# Patient Record
Sex: MALE | Race: BLACK OR AFRICAN AMERICAN
[De-identification: names, ages, dates, MRNs, and addresses within clinical notes are randomized per-mention and may not be internally consistent; named-entity substitution may affect disease eponyms.]

---

## 2018-04-18 ENCOUNTER — HOSPITAL ENCOUNTER (INPATIENT)
Dept: HOSPITAL 88 - ER | Age: 38
LOS: 3 days | Discharge: HOME | DRG: 419 | End: 2018-04-21
Attending: INTERNAL MEDICINE | Admitting: INTERNAL MEDICINE
Payer: COMMERCIAL

## 2018-04-18 VITALS — HEIGHT: 69 IN | BODY MASS INDEX: 46.65 KG/M2 | WEIGHT: 315 LBS

## 2018-04-18 DIAGNOSIS — E86.0: ICD-10-CM

## 2018-04-18 DIAGNOSIS — K80.12: Primary | ICD-10-CM

## 2018-04-18 DIAGNOSIS — E11.9: ICD-10-CM

## 2018-04-18 DIAGNOSIS — I10: ICD-10-CM

## 2018-04-18 LAB
ALBUMIN SERPL-MCNC: 3.6 G/DL (ref 3.5–5)
ALBUMIN/GLOB SERPL: 0.9 {RATIO} (ref 0.8–2)
ALP SERPL-CCNC: 69 IU/L (ref 40–150)
ALT SERPL-CCNC: 38 IU/L (ref 0–55)
AMYLASE SERPL-CCNC: 52 U/L (ref 25–125)
ANION GAP SERPL CALC-SCNC: 10.7 MMOL/L (ref 8–16)
BASOPHILS # BLD AUTO: 0.1 10*3/UL (ref 0–0.1)
BASOPHILS NFR BLD AUTO: 0.7 % (ref 0–1)
BUN SERPL-MCNC: 14 MG/DL (ref 7–26)
BUN/CREAT SERPL: 11 (ref 6–25)
CALCIUM SERPL-MCNC: 9.1 MG/DL (ref 8.4–10.2)
CHLORIDE SERPL-SCNC: 106 MMOL/L (ref 98–107)
CK SERPL-CCNC: 1039 IU/L (ref 30–200)
CO2 SERPL-SCNC: 26 MMOL/L (ref 22–29)
DEPRECATED NEUTROPHILS # BLD AUTO: 4 10*3/UL (ref 2.1–6.9)
EGFRCR SERPLBLD CKD-EPI 2021: > 60 ML/MIN (ref 60–?)
EOSINOPHIL # BLD AUTO: 0.1 10*3/UL (ref 0–0.4)
EOSINOPHIL NFR BLD AUTO: 0.9 % (ref 0–6)
ERYTHROCYTE [DISTWIDTH] IN CORD BLOOD: 14.2 % (ref 11.7–14.4)
GLOBULIN PLAS-MCNC: 3.8 G/DL (ref 2.3–3.5)
GLUCOSE SERPLBLD-MCNC: 94 MG/DL (ref 74–118)
HCT VFR BLD AUTO: 40.8 % (ref 38.2–49.6)
HGB BLD-MCNC: 13.5 G/DL (ref 14–18)
LIPASE SERPL-CCNC: 51 U/L (ref 8–78)
LYMPHOCYTES # BLD: 3.8 10*3/UL (ref 1–3.2)
LYMPHOCYTES NFR BLD AUTO: 43.8 % (ref 18–39.1)
MCH RBC QN AUTO: 29 PG (ref 28–32)
MCHC RBC AUTO-ENTMCNC: 33.1 G/DL (ref 31–35)
MCV RBC AUTO: 87.6 FL (ref 81–99)
MONOCYTES # BLD AUTO: 0.7 10*3/UL (ref 0.2–0.8)
MONOCYTES NFR BLD AUTO: 8 % (ref 4.4–11.3)
NEUTS SEG NFR BLD AUTO: 46.4 % (ref 38.7–80)
PLATELET # BLD AUTO: 352 X10E3/UL (ref 140–360)
POTASSIUM SERPL-SCNC: 3.7 MMOL/L (ref 3.5–5.1)
RBC # BLD AUTO: 4.66 X10E6/UL (ref 4.3–5.7)
SODIUM SERPL-SCNC: 139 MMOL/L (ref 136–145)

## 2018-04-18 PROCEDURE — 36415 COLL VENOUS BLD VENIPUNCTURE: CPT

## 2018-04-18 PROCEDURE — 88304 TISSUE EXAM BY PATHOLOGIST: CPT

## 2018-04-18 PROCEDURE — 82553 CREATINE MB FRACTION: CPT

## 2018-04-18 PROCEDURE — 80053 COMPREHEN METABOLIC PANEL: CPT

## 2018-04-18 PROCEDURE — 83690 ASSAY OF LIPASE: CPT

## 2018-04-18 PROCEDURE — 81001 URINALYSIS AUTO W/SCOPE: CPT

## 2018-04-18 PROCEDURE — 82150 ASSAY OF AMYLASE: CPT

## 2018-04-18 PROCEDURE — 93005 ELECTROCARDIOGRAM TRACING: CPT

## 2018-04-18 PROCEDURE — 76705 ECHO EXAM OF ABDOMEN: CPT

## 2018-04-18 PROCEDURE — 85025 COMPLETE CBC W/AUTO DIFF WBC: CPT

## 2018-04-18 PROCEDURE — 85379 FIBRIN DEGRADATION QUANT: CPT

## 2018-04-18 PROCEDURE — 71260 CT THORAX DX C+: CPT

## 2018-04-18 PROCEDURE — 84484 ASSAY OF TROPONIN QUANT: CPT

## 2018-04-18 PROCEDURE — 82550 ASSAY OF CK (CPK): CPT

## 2018-04-18 PROCEDURE — 71045 X-RAY EXAM CHEST 1 VIEW: CPT

## 2018-04-18 PROCEDURE — 82948 REAGENT STRIP/BLOOD GLUCOSE: CPT

## 2018-04-18 PROCEDURE — 99284 EMERGENCY DEPT VISIT MOD MDM: CPT

## 2018-04-19 VITALS — SYSTOLIC BLOOD PRESSURE: 125 MMHG | DIASTOLIC BLOOD PRESSURE: 58 MMHG

## 2018-04-19 VITALS — DIASTOLIC BLOOD PRESSURE: 57 MMHG | SYSTOLIC BLOOD PRESSURE: 123 MMHG

## 2018-04-19 VITALS — SYSTOLIC BLOOD PRESSURE: 97 MMHG | DIASTOLIC BLOOD PRESSURE: 55 MMHG

## 2018-04-19 VITALS — SYSTOLIC BLOOD PRESSURE: 121 MMHG | DIASTOLIC BLOOD PRESSURE: 74 MMHG

## 2018-04-19 VITALS — SYSTOLIC BLOOD PRESSURE: 94 MMHG | DIASTOLIC BLOOD PRESSURE: 54 MMHG

## 2018-04-19 LAB
BACTERIA URNS QL MICRO: (no result) /HPF
BILIRUB UR QL: NEGATIVE
CK MB SERPL-MCNC: 1.6 NG/ML (ref 0–5)
CLARITY UR: CLEAR
COLOR UR: YELLOW
DEPRECATED RBC URNS MANUAL-ACNC: (no result) /HPF (ref 0–5)
EPI CELLS URNS QL MICRO: (no result) /LPF
KETONES UR QL STRIP.AUTO: NEGATIVE
LEUKOCYTE ESTERASE UR QL STRIP.AUTO: NEGATIVE
NITRITE UR QL STRIP.AUTO: NEGATIVE
PROT UR QL STRIP.AUTO: NEGATIVE
SP GR UR STRIP: 1.02 (ref 1.01–1.02)
UROBILINOGEN UR STRIP-MCNC: 0.2 MG/DL (ref 0.2–1)
WBC #/AREA URNS HPF: (no result) /HPF (ref 0–5)

## 2018-04-19 RX ADMIN — SODIUM CHLORIDE SCH MLS/HR: 9 INJECTION, SOLUTION INTRAVENOUS at 02:03

## 2018-04-19 RX ADMIN — CEFOXITIN SODIUM SCH MLS/HR: 1 INJECTION, SOLUTION INTRAVENOUS at 02:03

## 2018-04-19 RX ADMIN — INSULIN HUMAN SCH UNIT: 100 INJECTION, SOLUTION PARENTERAL at 20:42

## 2018-04-19 RX ADMIN — METRONIDAZOLE SCH MLS/HR: 500 INJECTION, SOLUTION INTRAVENOUS at 02:40

## 2018-04-19 RX ADMIN — METRONIDAZOLE SCH MLS/HR: 500 INJECTION, SOLUTION INTRAVENOUS at 17:51

## 2018-04-19 RX ADMIN — INSULIN HUMAN SCH UNIT: 100 INJECTION, SOLUTION PARENTERAL at 11:30

## 2018-04-19 RX ADMIN — INSULIN HUMAN SCH UNIT: 100 INJECTION, SOLUTION PARENTERAL at 07:30

## 2018-04-19 RX ADMIN — METRONIDAZOLE SCH MLS/HR: 500 INJECTION, SOLUTION INTRAVENOUS at 12:35

## 2018-04-19 RX ADMIN — CEFOXITIN SCH GM: 1 INJECTION, POWDER, FOR SOLUTION INTRAVENOUS at 20:47

## 2018-04-19 RX ADMIN — INSULIN HUMAN SCH UNIT: 100 INJECTION, SOLUTION PARENTERAL at 16:26

## 2018-04-19 RX ADMIN — SODIUM CHLORIDE SCH MLS/HR: 9 INJECTION, SOLUTION INTRAVENOUS at 14:25

## 2018-04-19 RX ADMIN — CEFOXITIN SODIUM SCH MLS/HR: 1 INJECTION, SOLUTION INTRAVENOUS at 05:34

## 2018-04-19 RX ADMIN — CEFOXITIN SCH GM: 1 INJECTION, POWDER, FOR SOLUTION INTRAVENOUS at 14:25

## 2018-04-19 RX ADMIN — SODIUM CHLORIDE SCH MLS/HR: 9 INJECTION, SOLUTION INTRAVENOUS at 20:47

## 2018-04-19 RX ADMIN — METRONIDAZOLE SCH MLS/HR: 500 INJECTION, SOLUTION INTRAVENOUS at 06:35

## 2018-04-19 RX ADMIN — HYDROMORPHONE HYDROCHLORIDE PRN MG: 1 INJECTION, SOLUTION INTRAMUSCULAR; INTRAVENOUS; SUBCUTANEOUS at 04:52

## 2018-04-19 NOTE — DIAGNOSTIC IMAGING REPORT
EXAM: CT CHEST W

DATE: 4/18/2018 11:51 PM  

INDICATION:  Right-sided chest pain

COMPARISON:  None

TECHNIQUE: Multidetector CT scanning of the chest was performed.  Coronal and

sagittal multiplanar reformations were obtained.    

IV Contrast: 100 ml Isovue 370/300



FINDINGS:

Evaluation degraded by suboptimal contrast bolus and noise related to body

habitus. This precludes optimal assessment for detection of pulmonary embolism

particularly at the segmental/subsegmental level. However no acute central

pulmonary embolism is seen to the lobar level. Main pulmonary artery is normal

in size.

LUNGS AND PLEURA: No consolidations or edema.  No effusions or pneumothorax.



HEART, MEDIASTINUM, VESSELS: Heart size is upper limits of normal.   No

pericardial effusion.  No adenopathy.  There is a 2.1 x 2.8 cm left lower

parathoracic cystic lesion (HU 18).



UPPER ABDOMEN: Unremarkable.



MUSCULOSKELETAL: No acute findings.



IMPRESSION:

1. Degraded by noise and suboptimal contrast bolus. No evidence of acute

central pulmonary embolism.

2. 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

cyst. Consider follow-up CT or MRI to document stability.



Signed by: Dr Catherine Graves MD on 4/19/2018 12:49 AM

## 2018-04-19 NOTE — HISTORY AND PHYSICAL
CHIEF COMPLAINT:  Abdominal pain.



HISTORY:  Patient is a 37-year-old male with abdominal pain going on for a 

few days.  The patient came in with severe pain of 8/10.  Patient has 

multiple workups done.  His imaging test gallbladder ultrasound showing 

that he has gallstone at the gallbladder neck with gallbladder wall 

thickening, negative for pericholecystic fluid, however.  The patient does 

have hepatic steatosis.  On evaluation, the patient does have pain.  Has CT 

of the chest also done as well, there was no pulmonary embolism.  The 

patient is otherwise stable at this time.  Laboratory showing that his 

white cell count was 8.5.  Urinalysis unremarkable.  Patient does have some 

persistent pain.



PAST MEDICAL HISTORY:  Borderline diabetes, hypertension.



PAST SURGICAL HISTORY:  Noncontributory except for knee surgery.



SOCIAL HISTORY:  Patient does not smoke or use alcohol.  No recreational 

drug use.



ALLERGIES:  NO KNOWN ALLERGIES.



HOME MEDICATIONS:  Dyazide.



REVIEW OF SYSTEMS:  As mentioned.



PHYSICAL EXAMINATION:

VITAL SIGNS:  Temperature is 98, blood pressure 94/54, pulse rate is 48, 

respiration 18.

GENERAL:  The patient is not in acute distress.  He is awake.

HEENT:  Normocephalic, atraumatic.  Sclerae anicteric.

NECK:  Supple grossly.

PULMONARY:  Diminished breath sounds bilaterally without any wheezing or 

rales.

CARDIOVASCULAR:  S1, S2.  Regular rate and rhythm.

ABDOMEN:  Soft.  Right upper quadrant tenderness.  No rebound or guarding.

EXTREMITIES:  No gross cyanosis or edema.

NEUROLOGIC:  There is no gross focal deficit.



LABORATORY:  Sodium is 139, potassium 3.7, chloride 106, bicarb 25, BUN 14, 

creatinine 1.2, glucose 94.  WBC is 8.6, hemoglobin 13.5, hematocrit 41, 

platelets is 352,000.



IMPRESSION:

1. Acute cholecystitis associated with gallstone.

2. Nausea and vomiting.

3. Abdominal pain.

4. Low blood pressure secondary to dehydration.



PLAN:  IV fluids gently.  Hold off on blood pressure medication.  

Antibiotics.  Consultation with Dr. Micah Hung.  Will monitor patient 

closely.







DD:  04/19/2018 09:29

DT:  04/19/2018 10:01

Job#:  [id number]

## 2018-04-19 NOTE — DIAGNOSTIC IMAGING REPORT
CHEST SINGLE (PORTABLE), 4/18/2018 10:55 PM



Technique: CHEST SINGLE (PORTABLE)

Comparison: None

Clinical history: Chest pain



Findings:

Unremarkable appearance of the heart, mediastinum, lungs and pleural spaces.



Impression:

1. Lines/Tubes: None

2. No acute abnormality.



Signed by: Dr Catherine Graves MD on 4/19/2018 12:31 AM

## 2018-04-19 NOTE — CONSULTATION
DATE OF CONSULTATION:  April 19, 2018 



Patient is a 37-year-old male who came to the hospital with complaints of 

epigastric abdominal pain.  Says the pain started yesterday about an hour 

after eating.  He has not had similar pains in the past.  He says he feels 

better now.  Ultrasound of the gallbladder was done, which revealed 

gallstones with a stone impacted in the neck of the gallbladder.  There are 

no symptoms of jaundice.



PAST MEDICAL HISTORY:  Otherwise unremarkable.  He denies chronic medical 

problems.  He has had previous knee surgery, tonsillectomy.  Significant 

for hypertension, diabetes, previous diverticulitis.  



MEDICATIONS:  Listed in the chart.  



ALLERGIES:  HE HAS NO KNOWN ALLERGIES.  



FAMILY HISTORY:  Noncontributory.



SOCIAL HISTORY:  Patient does not smoke cigarettes or drink alcohol.



REVIEW OF SYSTEMS:  As stated above.  He has had no fever and no weight 

loss.   

 

PHYSICAL EXAMINATION

GENERAL:  The patient is awake, alert and in no distress.  

VITALS:  Normal. 

HEENT:  Unremarkable.  Sclerae are nonicteric. 

NECK:  Supple with no masses.  

LUNGS:  Equal breath sounds are clear bilaterally.  

CARDIAC:  Regular rate and rhythm. 



ABDOMEN:  Soft.  There slight epigastric tenderness.  There is no mass.  

There is no organomegaly. 

EXTREMITIES:  Have no edema. 

NEUROLOGIC:  Grossly intact. 



LAB TESTS:  The white blood cell count is normal.  Hemoglobin and 

hematocrit are normal.  Chemistries reveal elevated creatinine kinase of 

1000.  Liver function tests are normal.  Amylase and lipase are normal.



ASSESSMENT:  A 37-year-old male with symptoms suggestive of acute 

cholecystitis, but now he seems somewhat better.  He is to be evaluated by 

medicine, and would likely benefit from cholecystectomy.  Tentatively, plan 

to schedule the surgery for tomorrow.  Procedure was explained to the 

patient, including risks, benefits and alternatives.  He understands the 

procedures, and had the opportunity to ask questions.  He is aware of the 

possible need for open surgery.  



Thank you for asking me to see Mr. Maria.  



 





DD:  04/19/2018 06:30

DT:  04/19/2018 06:51

Job#:  [id number]

## 2018-04-19 NOTE — DIAGNOSTIC IMAGING REPORT
EXAM: US GALLBLADDER

DATE: 4/18/2018 12:00 AM  

INDICATION:      , Right upper quadrant pain after eating

COMPARISON: None 

TECHNIQUE: Transverse and longitudinal gray scale and color doppler sonographic

images of the upper abdomen were obtained. 



FINDINGS:    

Examination is degraded by large body habitus and overlying bowel gas.

LIVER 

Poorly visualized

14.9 cm in the right midclavicular line.

Increased echogenicity, normal contour, no masses.



GALLBLADDER

Cholelithiasis with gallstone lodged at the gallbladder neck. Associated

gallbladder wall thickening (4 to 5 mm).

No pericholecystic fluid.

Negative sonographic Moreira's sign.



BILE DUCTS

No intra nor extra-hepatic biliary dilation.

Common bile duct measures     cm



PANCREAS: Visualized portions are normal.



RIGHT KIDNEY: 11.7 cm

Echogenicity: Normal

Collecting System:  No hydronephrosis

Stones:  None

Cyst/Mass: None



VESSELS:

Aorta: Not well-visualized

Inferior Vena Cava: Not well visualized

Main Portal Vein: 0.8 cm, hepatopetal flow.



FREE FLUID: None



IMPRESSION:



1. Gallstone at the gallbladder neck with gallbladder wall thickening but

negative for pericholecystic fluid or Moreira's sign. Findings could reflect

early acute cholecystitis in the proper clinical context. Consider HIDA for

further evaluation.



2. Hepatic steatosis.



Signed by: Dr Catherine Graves MD on 4/19/2018 12:36 AM

## 2018-04-19 NOTE — XMS REPORT
Patient Summary Document

 Created on: 2018



GLEN RODRIGUEZ

External Reference #: [id number]

: 1980

Sex: Male



Demographics







 Address  [address] [place] [address]

 

 Home Phone  [phone]

 

 Preferred Language  Unknown

 

 Marital Status  Unknown

 

 [employer] Affiliation  Unknown

 

 Race  Unknown

 

 Additional Race(s)  

 

 Ethnic Group  Unknown





Author







 Author  [facility]

 

 Address  Unknown

 

 Phone  Unavailable







Care Team Providers







 Care Team Member Name  Role  Phone

 

 ALEX HUERTA  Unavailable  Unavailable







Problems

This patient has no known problems.



Allergies, Adverse Reactions, Alerts

This patient has no known allergies or adverse reactions.



Medications

This patient has no known medications.



Results







 Test Description  Test Time  Test Comments  Text Results  Atomic Results  
Result Comments









 CT CHEST W            [place] [address]      Patient Name: GLEN RODRIGUEZ
   MR #: [id number]    : 1980 Age/Sex: 37/M  Acct #: [id number] Req #
: [id number]  Adm Physician:     Ordered by: ALEX HUERTA MD  Report #
: [id number]   Location: ER  Room/Bed:     ______________________________________
_____________________________________________________________    Procedure: 0418
-0029 CT/CT CHEST W  Exam Date: 18                            Exam Time: 
       REPORT STATUS: Signed    EXAM: CT CHEST W   DATE: 2018 11:51 PM
     INDICATION:  Right-sided chest pain   COMPARISON:  None   TECHNIQUE: 
Multidetector CT scanning of the chest was performed.  Coronal and   sagittal 
multiplanar reformations were obtained.       IV Contrast: 100 ml Isovue 370/
300      FINDINGS:   Evaluation degraded by suboptimal contrast bolus and noise 
related to body   habitus. This precludes optimal assessment for detection of 
pulmonary embolism   particularly at the segmental/subsegmental level. However 
no acute central   pulmonary embolism is seen to the lobar level. Main 
pulmonary artery is normal   in size.   LUNGS AND PLEURA: No consolidations or 
edema.  No effusions or pneumothorax.      HEART, MEDIASTINUM, VESSELS: Heart 
size is upper limits of normal.   No   pericardial effusion.  No adenopathy.  
There is a 2.1 x 2.8 cm left lower   parathoracic cystic lesion (HU 18).      
UPPER ABDOMEN: Unremarkable.      MUSCULOSKELETAL: No acute findings.      
IMPRESSION:   1. Degraded by noise and suboptimal contrast bolus. No evidence 
of acute   central pulmonary embolism.   2. Left lower parathoracic2.8 cm 
cystic structure, likely a benign congenital   cyst. Consider follow-up CT or 
MRI to document stability.      Signed by: Dr Corey Graves MD on 2018 12:
49 AM        Dictated By: COREY GRAVES MD  Electronically Signed By: COREY GRAVES MD on 18  Transcribed By: PIERRE on 18       
COPY TO:   ALEX HUERTA MD           

 

 CHEST SINGLE (PORTABLE)            [place] [address]      Patient Name: GLEN RODRIGUEZ   MR #: [id number]    : 1980 Age/Sex: 37/M  Acct #: 
[id number] Req #: [id number]  Adm Physician:     Ordered by: ALEX HUERTA MD  Report #: [id number]   Location: ER  Room/Bed:     ________________
________________________________________________________________________________
___    Procedure: [id number] DX/CHEST SINGLE (PORTABLE)  Exam Date: 18    
                        Exam Time:        REPORT STATUS: Signed    CHEST 
SINGLE (PORTABLE), 2018 10:55 PM      Technique: CHEST SINGLE (PORTABLE)   
Comparison: None   Clinical history: Chest pain      Findings:   Unremarkable 
appearance of the heart, mediastinum, lungs and pleural spaces.      Impression
:   1. Lines/Tubes: None   2. No acute abnormality.      Signed by: Dr Corey Graves MD on 2018 12:31 AM        Dictated By: COREY GRAVES MD  
Electronically Signed By: COREY GRAVES MD on 18  Transcribed By: 
PIERRE on 18       COPY TO:   ALEX HUERAT MD           

 

 US GALLBLADDER            [place] [address]      Patient Name: GLEN RODRIGUEZ
   MR #: [id number]    : 1980 Age/Sex: 37/M  Acct #: [id number] Req #
: [id number]  Adm Physician:     Ordered by: ALEX HUERTA MD  Report #
: [id number]   Location: ER  Room/Bed:     ______________________________________
_____________________________________________________________    Procedure: 0418
-0012 US/US GALLBLADDER  Exam Date:                             Exam Time:     
   REPORT STATUS: Signed    EXAM: US GALLBLADDER   DATE: 2018 12:00 AM     
INDICATION:      , Right upper quadrant pain after eating   COMPARISON: None    
TECHNIQUE: Transverse and longitudinal gray scale and color doppler sonographic
   images of the upper abdomen were obtained.       FINDINGS:       Examination 
is degraded by large body habitus and overlying bowel gas.   LIVER    Poorly 
visualized   14.9 cm in the right midclavicular line.   Increased echogenicity, 
normal contour, no masses.      GALLBLADDER   Cholelithiasis with gallstone 
lodged at the gallbladder neck. Associated   gallbladder wall thickening (4 to 
5 mm).   No pericholecystic fluid.   Negative sonographic Moreira's sign.      
BILE DUCTS   No intra nor extra-hepatic biliary dilation.   Common bile duct 
measures     cm      PANCREAS: Visualized portions are normal.      RIGHT KIDNEY
: 11.7 cm   Echogenicity: Normal   Collecting System:  No hydronephrosis   
Stones:  None   Cyst/Mass: None      VESSELS:   Aorta: Not well-visualized   
Inferior Vena Cava: Not well visualized   Main Portal Vein: 0.8 cm, hepatopetal 
flow.      FREE FLUID: None      IMPRESSION:      1. Gallstone at the 
gallbladder neck with gallbladder wall thickening but   negative for 
pericholecystic fluid or Moreira's sign. Findings could reflect   early acute 
cholecystitis in the proper clinical context. Consider HIDA for   further 
evaluation.      2. Hepatic steatosis.      Signed by: Dr Corey Graves MD on  12:36 AM        Dictated By: COREY GRAVES MD  Electronically Signed 
By: COREY GRAVES MD on 18  Transcribed By: PIERRE on 18       COPY TO:   ALEX HUERTA MD

## 2018-04-20 VITALS — SYSTOLIC BLOOD PRESSURE: 131 MMHG | DIASTOLIC BLOOD PRESSURE: 75 MMHG

## 2018-04-20 VITALS — DIASTOLIC BLOOD PRESSURE: 67 MMHG | SYSTOLIC BLOOD PRESSURE: 127 MMHG

## 2018-04-20 VITALS — SYSTOLIC BLOOD PRESSURE: 138 MMHG | DIASTOLIC BLOOD PRESSURE: 61 MMHG

## 2018-04-20 VITALS — DIASTOLIC BLOOD PRESSURE: 78 MMHG | SYSTOLIC BLOOD PRESSURE: 137 MMHG

## 2018-04-20 VITALS — SYSTOLIC BLOOD PRESSURE: 128 MMHG | DIASTOLIC BLOOD PRESSURE: 70 MMHG

## 2018-04-20 VITALS — SYSTOLIC BLOOD PRESSURE: 122 MMHG | DIASTOLIC BLOOD PRESSURE: 63 MMHG

## 2018-04-20 VITALS — DIASTOLIC BLOOD PRESSURE: 72 MMHG | SYSTOLIC BLOOD PRESSURE: 130 MMHG

## 2018-04-20 VITALS — DIASTOLIC BLOOD PRESSURE: 45 MMHG | SYSTOLIC BLOOD PRESSURE: 112 MMHG

## 2018-04-20 VITALS — DIASTOLIC BLOOD PRESSURE: 72 MMHG | SYSTOLIC BLOOD PRESSURE: 120 MMHG

## 2018-04-20 VITALS — SYSTOLIC BLOOD PRESSURE: 112 MMHG | DIASTOLIC BLOOD PRESSURE: 45 MMHG

## 2018-04-20 LAB
ALBUMIN SERPL-MCNC: 2.9 G/DL (ref 3.5–5)
ALBUMIN/GLOB SERPL: 0.8 {RATIO} (ref 0.8–2)
ALP SERPL-CCNC: 57 IU/L (ref 40–150)
ALT SERPL-CCNC: 33 IU/L (ref 0–55)
AMYLASE SERPL-CCNC: 53 U/L (ref 25–125)
ANION GAP SERPL CALC-SCNC: 8.8 MMOL/L (ref 8–16)
BASOPHILS # BLD AUTO: 0.1 10*3/UL (ref 0–0.1)
BASOPHILS NFR BLD AUTO: 0.8 % (ref 0–1)
BUN SERPL-MCNC: 9 MG/DL (ref 7–26)
BUN/CREAT SERPL: 8 (ref 6–25)
CALCIUM SERPL-MCNC: 8.7 MG/DL (ref 8.4–10.2)
CHLORIDE SERPL-SCNC: 108 MMOL/L (ref 98–107)
CO2 SERPL-SCNC: 24 MMOL/L (ref 22–29)
DEPRECATED NEUTROPHILS # BLD AUTO: 2.4 10*3/UL (ref 2.1–6.9)
EGFRCR SERPLBLD CKD-EPI 2021: > 60 ML/MIN (ref 60–?)
EOSINOPHIL # BLD AUTO: 0.1 10*3/UL (ref 0–0.4)
EOSINOPHIL NFR BLD AUTO: 1.3 % (ref 0–6)
ERYTHROCYTE [DISTWIDTH] IN CORD BLOOD: 14.2 % (ref 11.7–14.4)
GLOBULIN PLAS-MCNC: 3.5 G/DL (ref 2.3–3.5)
GLUCOSE SERPLBLD-MCNC: 85 MG/DL (ref 74–118)
HCT VFR BLD AUTO: 38.4 % (ref 38.2–49.6)
HGB BLD-MCNC: 12.5 G/DL (ref 14–18)
LIPASE SERPL-CCNC: 51 U/L (ref 8–78)
LYMPHOCYTES # BLD: 3.2 10*3/UL (ref 1–3.2)
LYMPHOCYTES NFR BLD AUTO: 52 % (ref 18–39.1)
MCH RBC QN AUTO: 29.3 PG (ref 28–32)
MCHC RBC AUTO-ENTMCNC: 32.6 G/DL (ref 31–35)
MCV RBC AUTO: 90.1 FL (ref 81–99)
MONOCYTES # BLD AUTO: 0.4 10*3/UL (ref 0.2–0.8)
MONOCYTES NFR BLD AUTO: 6.5 % (ref 4.4–11.3)
NEUTS SEG NFR BLD AUTO: 39.2 % (ref 38.7–80)
PLATELET # BLD AUTO: 344 X10E3/UL (ref 140–360)
POTASSIUM SERPL-SCNC: 3.8 MMOL/L (ref 3.5–5.1)
RBC # BLD AUTO: 4.26 X10E6/UL (ref 4.3–5.7)
SODIUM SERPL-SCNC: 137 MMOL/L (ref 136–145)

## 2018-04-20 PROCEDURE — 0FT44ZZ RESECTION OF GALLBLADDER, PERCUTANEOUS ENDOSCOPIC APPROACH: ICD-10-PCS | Performed by: SURGERY

## 2018-04-20 RX ADMIN — INSULIN HUMAN SCH UNIT: 100 INJECTION, SOLUTION PARENTERAL at 07:30

## 2018-04-20 RX ADMIN — HYDROMORPHONE HYDROCHLORIDE PRN MG: 1 INJECTION, SOLUTION INTRAMUSCULAR; INTRAVENOUS; SUBCUTANEOUS at 16:15

## 2018-04-20 RX ADMIN — METRONIDAZOLE SCH MLS/HR: 500 INJECTION, SOLUTION INTRAVENOUS at 07:08

## 2018-04-20 RX ADMIN — INSULIN HUMAN SCH UNIT: 100 INJECTION, SOLUTION PARENTERAL at 16:30

## 2018-04-20 RX ADMIN — METRONIDAZOLE SCH MLS/HR: 500 INJECTION, SOLUTION INTRAVENOUS at 11:42

## 2018-04-20 RX ADMIN — INSULIN HUMAN SCH UNIT: 100 INJECTION, SOLUTION PARENTERAL at 21:00

## 2018-04-20 RX ADMIN — SODIUM CHLORIDE SCH MLS/HR: 9 INJECTION, SOLUTION INTRAVENOUS at 11:28

## 2018-04-20 RX ADMIN — CEFOXITIN SCH GM: 1 INJECTION, POWDER, FOR SOLUTION INTRAVENOUS at 14:00

## 2018-04-20 RX ADMIN — SODIUM CHLORIDE SCH MLS/HR: 9 INJECTION, SOLUTION INTRAVENOUS at 16:03

## 2018-04-20 RX ADMIN — CEFOXITIN SCH GM: 1 INJECTION, POWDER, FOR SOLUTION INTRAVENOUS at 05:46

## 2018-04-20 RX ADMIN — METRONIDAZOLE SCH MLS/HR: 500 INJECTION, SOLUTION INTRAVENOUS at 17:48

## 2018-04-20 RX ADMIN — INSULIN HUMAN SCH UNIT: 100 INJECTION, SOLUTION PARENTERAL at 11:30

## 2018-04-20 RX ADMIN — CEFOXITIN SCH GM: 1 INJECTION, POWDER, FOR SOLUTION INTRAVENOUS at 21:36

## 2018-04-20 RX ADMIN — METRONIDAZOLE SCH MLS/HR: 500 INJECTION, SOLUTION INTRAVENOUS at 01:15

## 2018-04-20 NOTE — OPERATIVE REPORT
DATE OF PROCEDURE:  April 20, 2018 

 

PREOPERATIVE DIAGNOSIS:  Acute and chronic cholecystitis, cholelithiasis.



POSTOPERATIVE DIAGNOSIS:  Acute and chronic cholecystitis, cholelithiasis.



PROCEDURE:  Diagnostic laparoscopy and laparoscopic cholecystectomy.  



ASSISTANT:  None.



ANESTHESIA:  General endotracheal.



INDICATIONS AND FINDINGS:  Patient is a 37-year-old male who was admitted 

with complaints of abdominal pain.  Workup revealed gallstones.  At surgery 

patient found to have gallbladder was distended, containing multiple 

stones.  Cystic duct was about 3 mm in diameter.  Common bile duct was 

about 6 mm in diameter.  Liver, stomach, lower abdomen all appeared normal.



TECHNIQUE:  After adequate general endotracheal anesthesia patient in 

supine position, the abdomen was prepped and draped in sterile fashion with 

Merlin solution.  Skin in the umbilicus was infiltrated with 1/2 percent 

Marcaine.  Incision made in the umbilicus.  Abdominal wall was elevated and 

Veress needle was introduced.  Pneumoperitoneum was then created.  A 10 mm 

trocar and cannula was then passed through the umbilical wound.  

Laparoscopic camera was introduced.  Initial laparoscopy revealed liver, 

stomach, lower abdomen all appeared normal.  A 10 mm trocar and cannula was 

placed in the epigastrium and two 5 mm trocars and cannulas placed in right 

upper quadrant.  These were placed under direct vision.  Fundus of the 

gallbladder was grasped and retracted superiorly.  Neck of the gallbladder 

was grasped retracted laterally.  Peritoneum over the neck of the 

gallbladder was incised.  Gallbladder cystic duct junction was dissected 

free.  Cystic artery was also dissected free.  The neck of the gallbladder 

completely dissected free.  Cystic duct was divided between Hemoclips with 

3 clips being left on the common bile duct side.  Cystic artery was also 

divided between Hemoclips close to the gallbladder.  The gallbladder was 

dissected free from the liver using scissors and electrocautery.  Once was 

entirely free it was placed into an Endo pouch and brought through the 

epigastric cannula, contained multiple stones.  Gallbladder bed was 

inspected for hemostasis which was seen to be adequate.  Irrigated with 

saline.  All fluid aspirated.  Inspected once again for hemostasis which 

was seen to be adequate.  Instruments and cannulas were then removed.  

Pneumoperitoneum was evacuated.  Wounds were then closed.  Fascia in the 

umbilical and epigastric wound closed with 0 Vicryl.  Skin to all wounds 

closed with staples.  Sterile dressings applied to each wound.  Patient 

tolerated procedure well.  Estimated blood loss was 10 mL.  There were no 

complications.  All counts were correct.  Patient was taken to the recovery 

room in satisfactory condition.  











DD:  04/20/2018 15:12

DT:  04/20/2018 15:21

Job#:  [id number]

## 2018-04-21 VITALS — DIASTOLIC BLOOD PRESSURE: 92 MMHG | SYSTOLIC BLOOD PRESSURE: 133 MMHG

## 2018-04-21 VITALS — DIASTOLIC BLOOD PRESSURE: 58 MMHG | SYSTOLIC BLOOD PRESSURE: 106 MMHG

## 2018-04-21 VITALS — DIASTOLIC BLOOD PRESSURE: 67 MMHG | SYSTOLIC BLOOD PRESSURE: 127 MMHG

## 2018-04-21 RX ADMIN — INSULIN HUMAN SCH UNIT: 100 INJECTION, SOLUTION PARENTERAL at 11:30

## 2018-04-21 RX ADMIN — METRONIDAZOLE SCH MLS/HR: 500 INJECTION, SOLUTION INTRAVENOUS at 12:00

## 2018-04-21 RX ADMIN — SODIUM CHLORIDE SCH MLS/HR: 9 INJECTION, SOLUTION INTRAVENOUS at 04:02

## 2018-04-21 RX ADMIN — SODIUM CHLORIDE SCH MLS/HR: 9 INJECTION, SOLUTION INTRAVENOUS at 11:07

## 2018-04-21 RX ADMIN — METRONIDAZOLE SCH MLS/HR: 500 INJECTION, SOLUTION INTRAVENOUS at 00:23

## 2018-04-21 RX ADMIN — METRONIDAZOLE SCH MLS/HR: 500 INJECTION, SOLUTION INTRAVENOUS at 05:52

## 2018-04-21 RX ADMIN — INSULIN HUMAN SCH UNIT: 100 INJECTION, SOLUTION PARENTERAL at 07:30

## 2018-04-21 RX ADMIN — CEFOXITIN SCH GM: 1 INJECTION, POWDER, FOR SOLUTION INTRAVENOUS at 05:52

## 2018-04-21 NOTE — DISCHARGE SUMMARY
PRIMARY CARE PHYSICIAN:  Unassigned.



CONSULTANT:  Dr. Micah Hung.



FINAL DIAGNOSES

1. Gallstone and acute cholecystitis associated with abdominal pain, 

nausea, vomiting, and dehydration.

2. Status post dehydration with low blood pressure, resolved.



SUMMARY:  A 37-year-old male with hypertension on diuretic, came in with 

acute cholecystitis associated with gallstone, has dehydration.  Blood 

pressure was low.  The patient was given multiple bolus of IV fluids and on 

antibiotic, Mefoxin.  Patient is doing much better.  He is stable.  The 

patient received laparoscopic cholecystectomy on April 20, 2018.  Postop, 

the patient is stable.  He is now tolerating all his diet.  Blood pressure 

is much improved.  Patient is stable.  He will go home today.  Discharge 

with Ultracet as needed, Questran as needed, and Zofran as needed.  He will 

resume his blood pressure medication.  The patient is stable.  Follow up 

with Dr. Micah Hung next week.  Two weeks of bland diet and then regular 

diet subsequently.









DD:  04/21/2018 12:45

DT:  04/21/2018 14:38

Job#:  [id number]